# Patient Record
Sex: MALE | Race: WHITE | URBAN - METROPOLITAN AREA
[De-identification: names, ages, dates, MRNs, and addresses within clinical notes are randomized per-mention and may not be internally consistent; named-entity substitution may affect disease eponyms.]

---

## 2019-04-05 ENCOUNTER — OFFICE VISIT (OUTPATIENT)
Dept: URGENT CARE | Facility: URGENT CARE | Age: 29
End: 2019-04-05
Payer: COMMERCIAL

## 2019-04-05 VITALS
RESPIRATION RATE: 20 BRPM | SYSTOLIC BLOOD PRESSURE: 124 MMHG | BODY MASS INDEX: 29.11 KG/M2 | HEART RATE: 67 BPM | WEIGHT: 200 LBS | OXYGEN SATURATION: 96 % | DIASTOLIC BLOOD PRESSURE: 78 MMHG | TEMPERATURE: 97.8 F

## 2019-04-05 DIAGNOSIS — R05.9 COUGH: ICD-10-CM

## 2019-04-05 DIAGNOSIS — J00 ACUTE RHINITIS: ICD-10-CM

## 2019-04-05 DIAGNOSIS — J02.9 SORE THROAT: Primary | ICD-10-CM

## 2019-04-05 LAB
DEPRECATED S PYO AG THROAT QL EIA: NORMAL
SPECIMEN SOURCE: NORMAL

## 2019-04-05 PROCEDURE — 87081 CULTURE SCREEN ONLY: CPT | Performed by: FAMILY MEDICINE

## 2019-04-05 PROCEDURE — 99213 OFFICE O/P EST LOW 20 MIN: CPT | Performed by: FAMILY MEDICINE

## 2019-04-05 PROCEDURE — 87880 STREP A ASSAY W/OPTIC: CPT | Performed by: FAMILY MEDICINE

## 2019-04-05 RX ORDER — CODEINE PHOSPHATE AND GUAIFENESIN 10; 100 MG/5ML; MG/5ML
1-2 SOLUTION ORAL EVERY 6 HOURS PRN
Qty: 120 ML | Refills: 0 | Status: SHIPPED | OUTPATIENT
Start: 2019-04-05

## 2019-04-05 RX ORDER — AMOXICILLIN 875 MG
875 TABLET ORAL 2 TIMES DAILY
Qty: 20 TABLET | Refills: 0 | Status: SHIPPED | OUTPATIENT
Start: 2019-04-05 | End: 2019-04-15

## 2019-04-05 RX ORDER — BENZONATATE 100 MG/1
200 CAPSULE ORAL 3 TIMES DAILY PRN
Qty: 42 CAPSULE | Refills: 3 | Status: SHIPPED | OUTPATIENT
Start: 2019-04-05

## 2019-04-05 NOTE — PATIENT INSTRUCTIONS
Fill the printed prescription for Amoxicillin if the sinus symptoms fail to improve in 4-5 days.      Humidifier, Steam for the sinuses and lungs    Go to the emergency room if you develop worsening, severe shortness of breath with fever.      follow up with your primary care provider if not better in 7-10 days.

## 2019-04-05 NOTE — PROGRESS NOTES
SUBJECTIVE:   Justyn Matute is a 28 year old male presenting with a chief complaint of cough (The cough comes and goes.  There is severe coughing, productive of yellowish), sore throat (pain with swallowing if not drinking fluids a lot), sinus pressure (at the cheeks), stuffy nose, hoarseness.   .No shortness of breath.   He had a fever six days ago until 4 days ago.    Onset of symptoms was five days ago.  Course of illness is still present. .    Current and Associated symptoms: as listed above.   Treatment measures tried include Mucinex Fast-Max Severe Cold.  .  Predisposing factors include sick coworkers.  .    Past Medical History:    No major medical problems.     Current Outpatient Medications   Medication Sig Dispense Refill     ketoconazole (NIZORAL) 2 % shampoo Apply to the affected area and wash off after 5 minutes. For dandruff 120 mL 3     Multiple Vitamin (MULTIVITAMIN  S) CAPS Take  by mouth daily.       omega 3 1000 MG CAPS Take 1 g by mouth daily 90 capsule      Social History     Tobacco Use     Smoking status: Never Smoker   Substance Use Topics     Alcohol use: Yes     Comment: 1-2 drinks per week       ROS:  CONSTITUTIONAL:NEGATIVE for fever, chills, change in weight  ENT/MOUTH: positive for sore throat, sinus pressure at the cheeks, stuffy nose.   RESP:positive for cough.       OBJECTIVE:  /78 (BP Location: Right arm, Patient Position: Left side, Cuff Size: Adult Regular)   Pulse 67   Temp 97.8  F (36.6  C)   Resp 20   Wt 90.7 kg (200 lb)   SpO2 96%   BMI 29.11 kg/m    GENERAL APPEARANCE: healthy, alert and no distress. Voice is hoarse.    HENT: TM's normal bilaterally, nasal turbinates erythematous, swollen and oropharynx had mild to moderate erythema without exudates.    NECK: supple, nontender, no lymphadenopathy  RESP: lungs clear to auscultation - no rales, rhonchi or wheezes  CV: regular rates and rhythm, normal S1 S2, no murmur noted  SKIN: no suspicious lesions or  sarika    LAB:    Results for orders placed or performed in visit on 04/05/19   Rapid strep screen   Result Value Ref Range    Specimen Description Throat     Rapid Strep A Screen       NEGATIVE: No Group A streptococcal antigen detected by immunoassay, await culture report.       ASSESSMENT:  Acute rhinitis  Cough  Sore Throat      PLAN:  For the Acute Rhinitis:  If the symptoms fail to improve in 4-5 days, fill the printed Rx for Amoxicillin  Steam, humidifier,    For the Cough:  Rx:  Tessalon Perles, Cheratussin  Steam, Humidifier  follow up with the primary care provider if not better in 7-10 days  Go to the emergency room if having worsening severe shortness of breath.     For the Sore throat:  Throat culture is pending.       Daniel Soria MD

## 2019-04-06 LAB
BACTERIA SPEC CULT: NORMAL
SPECIMEN SOURCE: NORMAL

## 2019-04-22 ENCOUNTER — OFFICE VISIT (OUTPATIENT)
Dept: URGENT CARE | Facility: URGENT CARE | Age: 29
End: 2019-04-22
Payer: COMMERCIAL

## 2019-04-22 VITALS
DIASTOLIC BLOOD PRESSURE: 72 MMHG | SYSTOLIC BLOOD PRESSURE: 128 MMHG | BODY MASS INDEX: 29.11 KG/M2 | HEART RATE: 58 BPM | OXYGEN SATURATION: 99 % | RESPIRATION RATE: 18 BRPM | TEMPERATURE: 97.8 F | WEIGHT: 200 LBS

## 2019-04-22 DIAGNOSIS — L03.116 CELLULITIS OF LEFT LEG: Primary | ICD-10-CM

## 2019-04-22 PROCEDURE — 99213 OFFICE O/P EST LOW 20 MIN: CPT | Performed by: PHYSICIAN ASSISTANT

## 2019-04-22 RX ORDER — SULFAMETHOXAZOLE/TRIMETHOPRIM 800-160 MG
1 TABLET ORAL 2 TIMES DAILY
Qty: 20 TABLET | Refills: 0 | Status: SHIPPED | OUTPATIENT
Start: 2019-04-22 | End: 2019-05-02

## 2019-05-19 NOTE — PROGRESS NOTES
SUBJECTIVE:  Justyn Matute is a 28 year old male who presents to the clinic today for area of redness on left leg after sustained a cut a few days ago . Did bleed.  Has some pain in area.  Denies drainage or fevers.  Area seems little warm to the touch      No hx of MRSA    No past medical history on file.  Current Outpatient Medications   Medication Sig Dispense Refill     guaiFENesin-codeine (ROBITUSSIN AC) 100-10 MG/5ML solution Take 5-10 mLs by mouth every 6 hours as needed for cough 120 mL 0     ketoconazole (NIZORAL) 2 % shampoo Apply to the affected area and wash off after 5 minutes. For dandruff 120 mL 3     Multiple Vitamin (MULTIVITAMIN  S) CAPS Take  by mouth daily.       omega 3 1000 MG CAPS Take 1 g by mouth daily 90 capsule      benzonatate (TESSALON) 100 MG capsule Take 2 capsules (200 mg) by mouth 3 times daily as needed for cough 42 capsule 3     Social History     Tobacco Use     Smoking status: Never Smoker   Substance Use Topics     Alcohol use: Yes     Comment: 1-2 drinks per week       ROS:  Review of systems negative except as stated above.    EXAM:   /72 (BP Location: Right arm, Patient Position: Sitting, Cuff Size: Adult Regular)   Pulse 58   Temp 97.8  F (36.6  C)   Resp 18   Wt 90.7 kg (200 lb)   SpO2 99%   BMI 29.11 kg/m    GENERAL: alert, no acute distress.  SKIN: left leg with inch healing cut and surrounding erythema and tenderness to touch . No abscess or drainage from area.  No streaking proximally.  GENERAL APPEARANCE: healthy, alert and no distress  EYES: EOMI,  PERRL, conjunctiva clear  NECK: supple, non-tender to palpation, no adenopathy noted  RESP: lungs clear to auscultation - no rales, rhonchi or wheezes  CV: regular rates and rhythm, normal S1 S2, no murmur noted    assessment/plan:  (L03.116) Cellulitis of left leg  (primary encounter diagnosis)  Comment:   Plan: sulfamethoxazole-trimethoprim (BACTRIM         DS/SEPTRA DS) 800-160 MG tablet          Wound  cleaned and medication as directed.  Watch for fevers or signs of spreading infection including increased redness or drainage.  Follow-up with PCP as needed